# Patient Record
Sex: FEMALE | Race: BLACK OR AFRICAN AMERICAN
[De-identification: names, ages, dates, MRNs, and addresses within clinical notes are randomized per-mention and may not be internally consistent; named-entity substitution may affect disease eponyms.]

---

## 2018-07-30 ENCOUNTER — HOSPITAL ENCOUNTER (OUTPATIENT)
Dept: HOSPITAL 92 - ERS | Age: 68
Setting detail: OBSERVATION
LOS: 1 days | Discharge: HOME | End: 2018-07-31
Attending: HOSPITALIST | Admitting: HOSPITALIST
Payer: MEDICARE

## 2018-07-30 VITALS — BODY MASS INDEX: 32.2 KG/M2

## 2018-07-30 DIAGNOSIS — I50.33: ICD-10-CM

## 2018-07-30 DIAGNOSIS — I48.92: ICD-10-CM

## 2018-07-30 DIAGNOSIS — Z79.899: ICD-10-CM

## 2018-07-30 DIAGNOSIS — Z88.5: ICD-10-CM

## 2018-07-30 DIAGNOSIS — E78.5: ICD-10-CM

## 2018-07-30 DIAGNOSIS — E11.9: ICD-10-CM

## 2018-07-30 DIAGNOSIS — I25.10: ICD-10-CM

## 2018-07-30 DIAGNOSIS — J44.1: Primary | ICD-10-CM

## 2018-07-30 DIAGNOSIS — F17.200: ICD-10-CM

## 2018-07-30 DIAGNOSIS — Z79.02: ICD-10-CM

## 2018-07-30 DIAGNOSIS — I11.0: ICD-10-CM

## 2018-07-30 DIAGNOSIS — J96.01: ICD-10-CM

## 2018-07-30 LAB
ALBUMIN SERPL BCG-MCNC: 3.6 G/DL (ref 3.4–4.8)
ALP SERPL-CCNC: 125 U/L (ref 40–150)
ALT SERPL W P-5'-P-CCNC: 28 U/L (ref 8–55)
ANION GAP SERPL CALC-SCNC: 14 MMOL/L (ref 10–20)
AST SERPL-CCNC: 29 U/L (ref 5–34)
BILIRUB SERPL-MCNC: 0.3 MG/DL (ref 0.2–1.2)
BUN SERPL-MCNC: 17 MG/DL (ref 9.8–20.1)
CALCIUM SERPL-MCNC: 9 MG/DL (ref 7.8–10.44)
CHLORIDE SERPL-SCNC: 102 MMOL/L (ref 98–107)
CK MB SERPL-MCNC: 1.4 NG/ML (ref 0–6.6)
CK SERPL-CCNC: 47 U/L (ref 29–168)
CO2 SERPL-SCNC: 23 MMOL/L (ref 23–31)
CREAT CL PREDICTED SERPL C-G-VRATE: 0 ML/MIN (ref 70–130)
GLOBULIN SER CALC-MCNC: 3.2 G/DL (ref 2.4–3.5)
GLUCOSE SERPL-MCNC: 351 MG/DL (ref 80–115)
HGB BLD-MCNC: 10 G/DL (ref 12–16)
MCH RBC QN AUTO: 25.7 PG (ref 27–31)
MCV RBC AUTO: 78.6 FL (ref 78–98)
MDIFF COMPLETE?: YES
PLATELET # BLD AUTO: 242 THOU/UL (ref 130–400)
PLATELET BLD QL SMEAR: (no result)
POTASSIUM SERPL-SCNC: 3.2 MMOL/L (ref 3.5–5.1)
RBC # BLD AUTO: 3.88 MILL/UL (ref 4.2–5.4)
SODIUM SERPL-SCNC: 136 MMOL/L (ref 136–145)
TROPONIN I SERPL DL<=0.01 NG/ML-MCNC: (no result) NG/ML (ref ?–0.03)
WBC # BLD AUTO: 4.6 THOU/UL (ref 4.8–10.8)

## 2018-07-30 PROCEDURE — 83880 ASSAY OF NATRIURETIC PEPTIDE: CPT

## 2018-07-30 PROCEDURE — 84484 ASSAY OF TROPONIN QUANT: CPT

## 2018-07-30 PROCEDURE — 96376 TX/PRO/DX INJ SAME DRUG ADON: CPT

## 2018-07-30 PROCEDURE — 82553 CREATINE MB FRACTION: CPT

## 2018-07-30 PROCEDURE — 80048 BASIC METABOLIC PNL TOTAL CA: CPT

## 2018-07-30 PROCEDURE — 94640 AIRWAY INHALATION TREATMENT: CPT

## 2018-07-30 PROCEDURE — 80053 COMPREHEN METABOLIC PANEL: CPT

## 2018-07-30 PROCEDURE — 94760 N-INVAS EAR/PLS OXIMETRY 1: CPT

## 2018-07-30 PROCEDURE — G0378 HOSPITAL OBSERVATION PER HR: HCPCS

## 2018-07-30 PROCEDURE — A4216 STERILE WATER/SALINE, 10 ML: HCPCS

## 2018-07-30 PROCEDURE — 93798 PHYS/QHP OP CAR RHAB W/ECG: CPT

## 2018-07-30 PROCEDURE — 36416 COLLJ CAPILLARY BLOOD SPEC: CPT

## 2018-07-30 PROCEDURE — 96372 THER/PROPH/DIAG INJ SC/IM: CPT

## 2018-07-30 PROCEDURE — 97139 UNLISTED THERAPEUTIC PX: CPT

## 2018-07-30 PROCEDURE — 96374 THER/PROPH/DIAG INJ IV PUSH: CPT

## 2018-07-30 PROCEDURE — 93306 TTE W/DOPPLER COMPLETE: CPT

## 2018-07-30 PROCEDURE — 71046 X-RAY EXAM CHEST 2 VIEWS: CPT

## 2018-07-30 PROCEDURE — 82550 ASSAY OF CK (CPK): CPT

## 2018-07-30 PROCEDURE — 85025 COMPLETE CBC W/AUTO DIFF WBC: CPT

## 2018-07-30 PROCEDURE — 36415 COLL VENOUS BLD VENIPUNCTURE: CPT

## 2018-07-30 PROCEDURE — 93005 ELECTROCARDIOGRAM TRACING: CPT

## 2018-07-30 PROCEDURE — 82962 GLUCOSE BLOOD TEST: CPT

## 2018-07-30 PROCEDURE — 99285 EMERGENCY DEPT VISIT HI MDM: CPT

## 2018-07-30 RX ADMIN — NIFEDIPINE SCH MG: 30 TABLET, FILM COATED, EXTENDED RELEASE ORAL at 08:58

## 2018-07-30 RX ADMIN — INSULIN GLARGINE SCH MLS: 100 INJECTION, SOLUTION SUBCUTANEOUS at 09:54

## 2018-07-30 RX ADMIN — INSULIN LISPRO PRN UNIT: 100 INJECTION, SOLUTION INTRAVENOUS; SUBCUTANEOUS at 17:34

## 2018-07-30 RX ADMIN — ASPIRIN SCH MG: 81 TABLET ORAL at 08:57

## 2018-07-30 RX ADMIN — Medication SCH ML: at 09:03

## 2018-07-30 RX ADMIN — INSULIN LISPRO PRN UNIT: 100 INJECTION, SOLUTION INTRAVENOUS; SUBCUTANEOUS at 07:13

## 2018-07-30 RX ADMIN — INSULIN LISPRO PRN UNIT: 100 INJECTION, SOLUTION INTRAVENOUS; SUBCUTANEOUS at 21:18

## 2018-07-30 RX ADMIN — Medication SCH ML: at 20:23

## 2018-07-30 NOTE — RAD
2 VIEWS CHEST:

 

Date:  07/30/18 

 

COMPARISON:  

02/04/15. 

 

HISTORY:  

Dyspnea and shortness of breath. 

 

FINDINGS:

There is pulmonary vascular congestion. There is atherosclerotic calcification of the aortic arch. No
 pneumothorax or pleural fluid is seen. There is no focal consolidation or alveolar edema. There is m
ild perihilar and bibasilar interstitial prominence. 

 

IMPRESSION: 

Findings suggesting mild interstitial pulmonary edema. Recommend follow-up imaging following treatmen
t. 

 

 

POS: [facility]

## 2018-07-30 NOTE — PDOC.EVN
Event Note





- Event Note


Event Note: 





Pt seen and examined. chart reviewed


feels much better and is on RA without any SOB





Monitor strips show rate controlled A-flutter. No h/o same .Follows with Dr. Webber(cardiology) in [place] .H/O Severe CAD on Cath in 2014.





Will consult EP.follow ECHO results. may need further cardiologoical work up if 

EF <35%.


No evidence of ACS.


cont meds as ordered by admitting MD.


cont IV Solumedrol,duonebs,Lasix IV etc





am labs


will follow

## 2018-07-30 NOTE — HP
DATE OF ADMISSION:  07/30/2018

 

CHIEF COMPLAINT:  Shortness of breath and increased cough.

 

HISTORY OF PRESENT ILLNESS:  This is a 68-year-old  female with known history of audrey
estive heart failure, hypertension, type 2 diabetes mellitus, has a history of COPD.  The patient is 
noted to have a worsening cough and congestion with increased sputum production for the past few days
, so she decided to come to the ER for further evaluation.  When she presented to the ER, she had a m
ildly elevated BNP and she was very hypoxic, but improving on the breathing treatments.  The patient 
was about to go home, but she was noted to have increased heart rate of 130s.  The patient had normal
 oxygenation, but because of the worsening tachycardia which was suspicious for paroxysmal atrial fib
rillation, the patient was admitted for further evaluation.  The patient is seen on the floor.  She w
as alert and oriented.  She does agree that she is smoking a lot and she has known history of COPD.  
She goes to a pulmonologist in [place].  Denies having any chest pain at this time.  No dizziness, no
 nausea, no vomiting.  She denies having any fevers.

 

PAST MEDICAL HISTORY:

1.  COPD.

2.  Congestive heart failure.

3.  Type 2 diabetes mellitus.

4.  Hypertension.

5.  Hyperlipidemia.

6.  History of coronary artery disease.

 

PAST SURGICAL HISTORY:

1.  Hysterectomy.

2.  Cyst removal from the neck.

3.  Cardiac catheterization.

 

ALLERGIES:  CODEINE.

 

SOCIAL HISTORY:  The patient is an active smoker.  She denies alcohol or any recreational drugs.

 

FAMILY HISTORY:  Relevant for colon cancer, type 2 diabetes mellitus in one of the family members.

 

REVIEW OF SYSTEMS:  All 12 systems are reviewed with the patient thoroughly and found to be negative 
at this time.  

Constitutional:  Weight loss or gain, ability to conduct usual

activities.

Skin:  Rash, itching.

Eyes:  Double vision, pain.

ENT/Mouth:  Nose bleeding, neck stiffness, pain, tenderness.

Cardiovascular:  Palpitations, dyspnea on exertion, orthopnea.

Respiratory:  Shortness of breath, wheezing, cough, hemoptysis, 

fever or night sweats.

Gastrointestinal:  Poor appetite, abdominal pain, heartburn, 

nausea, vomiting, constipation, or diarrhea.

Genitourinary:  Urgency, frequency, dysuria, nocturia.

Musculoskeletal:  Pain, swelling.

Neurologic/Psychiatric:  Anxiety, depression.

Allergy/Immunologic:  Skin rash, bleeding tendency.

HOME MEDICATIONS:

1.  Lasix 40 mg p.o. daily.  

2.  Metformin 1000 mg p.o. b.i.d.

3.  Aspirin 81 mg p.o. daily.

4.  Atorvastatin 40 mg p.o. daily.

5.  Biotin. 

6.  Ferrous sulfate.

7.  Metoprolol 100 mg p.o. b.i.d.

8.  Montelukast 10 mg p.o. daily.

9.  Nifedipine 30 mg p.o. daily.

10.  Protonix 40 mg p.o. daily.

11.  Potassium chloride 20 mEq p.o. daily.

12.  Sertraline 100 mg p.o. daily.  

13.  Tramadol.

 

PHYSICAL EXAMINATION:

VITAL SIGNS:  Blood pressures are 145/68, heart rate is 91, respiration rate is 22, saturation 93%.

GENERAL:  The patient is moderately built and moderate nourished, does not appear dyspneic acute dist
ress at this time.  Alert and oriented x3.

MUSCULOSKELETAL:  No calf tenderness.  No pedal edema.  No joint redness, no joint swelling.

LUNGS:  Bilateral air entry was reduced with wheezing noted bilaterally.  No use of any accessory mus
cles of respiration.  No evidence of crackles were noted.

ABDOMEN:  Soft, nontender, no guarding, no rebound tenderness.  Bowel sounds normal.

MUSCULOSKELETAL:  No calf tenderness.  No pedal edema.  No joint tenderness, no joint swelling.

SKIN:  No cyanosis, no erythema, no rash, no pallor.

NEUROLOGIC:  Cranial nerves II through XII intact.  No focal deficits were noted at this time.

PSYCHIATRIC:  No signs of suicidal ideation.  No signs of jn was noted.

 

LABORATORY DATA:  WBC 4.6, hemoglobin is 10.1, hematocrit 30.5, platelets 242.  Sodium 136, potassium
 3.2, chloride 102, bicarbonate 23, BUN 17, creatinine 1.27, blood sugar is 433.

 

ASSESSMENT:

1.  Acute chronic obstructive pulmonary disease exacerbation.

2.  Acute congestive heart failure, diastolic dysfunction.

3.  Hypertension.

4.  Hyperglycemia with type 2 diabetes mellitus.

5.  Hyperlipidemia.

6.  Hypothyroidism.

 

PLAN:

1.  Plan is to closely monitor this patient.  We will continue the DuoNebs q.4h. and albuterol nebs q
.2h. Patient has clear signs of congestion with a prolonged expiratory phase suggestive of bronchocon
striction.  We will start the patient on Solu-Medrol 40 mg q.6h.

2.  The patient has history of type 2 diabetes mellitus, has poorly controlled blood sugars at 400.  
We will start the patient on Lantus 30 units subcu daily and with sliding scale insulin.

3.  Hypertension is well controlled.  We will restart the patient's home medications.

4.  Hyperlipidemia.  We will continue the patient's atorvastatin at this time.

5.  The patient has sinus tachycardia.  I will continue the patient's metoprolol tartrate, likely thi
s is from nebulizer treatment she received in the ER.

6.  Deep venous thrombosis prophylaxis, Lovenox.

 

I spent 75 minutes with this patient.

## 2018-07-31 VITALS — TEMPERATURE: 98.2 F

## 2018-07-31 VITALS — DIASTOLIC BLOOD PRESSURE: 71 MMHG | SYSTOLIC BLOOD PRESSURE: 148 MMHG

## 2018-07-31 LAB
ANION GAP SERPL CALC-SCNC: 13 MMOL/L (ref 10–20)
BASOPHILS # BLD AUTO: 0 THOU/UL (ref 0–0.2)
BASOPHILS NFR BLD AUTO: 0.4 % (ref 0–1)
BUN SERPL-MCNC: 20 MG/DL (ref 9.8–20.1)
CALCIUM SERPL-MCNC: 9.3 MG/DL (ref 7.8–10.44)
CHLORIDE SERPL-SCNC: 102 MMOL/L (ref 98–107)
CO2 SERPL-SCNC: 27 MMOL/L (ref 23–31)
CREAT CL PREDICTED SERPL C-G-VRATE: 82 ML/MIN (ref 70–130)
EOSINOPHIL # BLD AUTO: 0 THOU/UL (ref 0–0.7)
EOSINOPHIL NFR BLD AUTO: 0 % (ref 0–10)
GLUCOSE SERPL-MCNC: 289 MG/DL (ref 80–115)
HGB BLD-MCNC: 9.7 G/DL (ref 12–16)
LYMPHOCYTES # BLD: 0.6 THOU/UL (ref 1.2–3.4)
LYMPHOCYTES NFR BLD AUTO: 12.7 % (ref 21–51)
MCH RBC QN AUTO: 25 PG (ref 27–31)
MCV RBC AUTO: 79.1 FL (ref 78–98)
MONOCYTES # BLD AUTO: 0.3 THOU/UL (ref 0.11–0.59)
MONOCYTES NFR BLD AUTO: 5.4 % (ref 0–10)
NEUTROPHILS # BLD AUTO: 4 THOU/UL (ref 1.4–6.5)
NEUTROPHILS NFR BLD AUTO: 81.4 % (ref 42–75)
PLATELET # BLD AUTO: 248 THOU/UL (ref 130–400)
POTASSIUM SERPL-SCNC: 3.8 MMOL/L (ref 3.5–5.1)
RBC # BLD AUTO: 3.86 MILL/UL (ref 4.2–5.4)
SODIUM SERPL-SCNC: 138 MMOL/L (ref 136–145)
WBC # BLD AUTO: 5 THOU/UL (ref 4.8–10.8)

## 2018-07-31 RX ADMIN — NIFEDIPINE SCH MG: 30 TABLET, FILM COATED, EXTENDED RELEASE ORAL at 10:05

## 2018-07-31 RX ADMIN — INSULIN LISPRO PRN UNIT: 100 INJECTION, SOLUTION INTRAVENOUS; SUBCUTANEOUS at 06:27

## 2018-07-31 RX ADMIN — Medication SCH ML: at 10:06

## 2018-07-31 RX ADMIN — INSULIN GLARGINE SCH MLS: 100 INJECTION, SOLUTION SUBCUTANEOUS at 10:03

## 2018-07-31 RX ADMIN — ASPIRIN SCH MG: 81 TABLET ORAL at 10:03

## 2018-07-31 RX ADMIN — INSULIN LISPRO PRN UNIT: 100 INJECTION, SOLUTION INTRAVENOUS; SUBCUTANEOUS at 12:01

## 2018-07-31 NOTE — CON
DATE OF CONSULTATION:  07/30/2018

 

ELECTROPHYSIOLOGY CONSULTATION REPORT

 

REFERRING PHYSICIAN:  Dr. Mack.

 

I am seeing Ms. Swain at our [facility] telemetry floor as an electrophysiology consultant.
  Her problems are:

1.  Newly found atrial flutter with rapid ventricular rates.

2.  Acute respiratory failure with underlying COPD exacerbation and possible congestive heart failure
 exacerbation.

3.  Risk factors including type 2 diabetes, hypertension, and hyperlipidemia.

4.  Prior history of coronary artery disease with remote history of stenting in 2002, still on aspiri
n and Plavix.

 

ALLERGIES:  CODEINE.

 

MEDICATIONS:  At home include hydrocodone, albuterol, aspirin, Lipitor, dextrose, Lovenox, ferrous delacruz
lfate, furosemide, glucagon, insulin, lisinopril, metoprolol tartrate 100 mg twice a day.

 

SUBJECTIVE:  Ms. Swain is here with progressive dyspnea.  She noted fevers, chills, worsening cough 
with sputum production for at least last couple of days.  She did notice increasing fatigue, was not 
aware of her rapid heartbeats.  Rest of 12-point systems otherwise unremarkable.

 

PAST MEDICAL HISTORY:  As above.  She has been followed by cardiologist in [place] after her stent, b
ut no recent need for cardiac intervention was noted.  She denies prior history of arrhythmias.

 

PAST SURGICAL HISTORY:  Significant for hysterectomy, cyst removal and stent placement in the past.

 

SOCIAL HISTORY:  The patient is an active smoker.  Denies ETOH or drug abuse.

 

FAMILY HISTORY:  Significant for colon cancer, type 2 diabetes in one of the family members.

 

OBJECTIVE:

VITAL SIGNS:  Blood pressure is 144/62 in a supine position, 127/60 on a sitting position, 120/58 sta
nding up; heart rate 85; respirations 20; temperature 98.2 degrees Fahrenheit.

GENERAL:  Alert and oriented woman, in no apparent distress.

NECK:  Supple.  Jugular veins not distended.

CHEST:  Coarse without crackles.

CARDIOVASCULAR:  Heart sounds are irregularly irregular.  S1 and S2 is variable.  No murmur or gallop
.

ABDOMEN:  Benign.  Bowel sounds positive.

EXTREMITIES:  Lower extremities without edema, clubbing or cyanosis.  Pulses are adequate.

NEUROLOGIC:  The patient is nonfocal.

MUSCULOSKELETAL:  No joint swelling or deformities.

SKIN:  Without rash.

 

DATABASE:  EKGs reviewed.  Initial EKG reveals an atrial flutter, possibly typically _____ morphology
, although atypical features also noted.  A 2D echo from 07/30/2018 shows LVEF of 55% to 60%, mild di
lated left atrium, mitral regurgitation, mild tricuspid regurgitation.

 

LABORATORY DATA:  White count 4.6, hemoglobin 10, platelet count is 242.  Sodium 136, potassium 3.2, 
BUN 17, creatinine 1.27 _____ BNP is 402.  Troponin I is less than 0.01.

 

ASSESSMENT AND PLAN:  Ms. Swain is a 68-year-old woman with history of coronary artery stenting in t
he distant past, currently on aspirin and Plavix.  She is admitted with upper respiratory tract infec
tion symptoms without profound toxic signs.  On the other hand, she was found to be in atrial flutter
, which was not known to her before.  The rates are somewhat rapid initially, but overall well contro
lled, hence she is chronically taking metoprolol high dose.

 

We discussed treatment options for the atrial flutter.  At this point, I would recommend adding an an
ticoagulant.  She could benefit from a cardioversion, possibly RACHELLE guided before returning home, but 
also this could be performed as an outpatient as well after stabilization from her pulmonary conditio
n.  I also discussed ablation options, which could be considered in the future if recurrences are see
n after resolution of her initial episode associated with acute respiratory failure.

 

We will follow with you.  Thank you for the consult.

## 2018-08-01 NOTE — DIS
DATE OF ADMISSION:  07/30/2018

 

DATE OF DISCHARGE:  07/31/2018

 

CONDITION AT THE TIME OF DISCHARGE:  Stable and improved.

 

PRIMARY CARE PHYSICIAN:  Dr. Trimble in [place].

 

PRIMARY CARDIOLOGIST:  Dr. Webber in [place].

 

DISCHARGE DIAGNOSES:

1.  New diagnosis of atrial flutter with rapid ventricular now rate, now controlled.

2.  Acute on chronic diastolic congestive heart failure.

3.  Acute chronic obstructive pulmonary disease exacerbation.

4.  Acute hypoxic respiratory failure, resolved.

5.  History of chronic obstructive pulmonary disease.

6.  Diabetes type 2.

7.  Hypertension.

8.  Dyslipidemia.

9.  Coronary artery disease.

 

INHOUSE CONSULTATION:  Electrophysiology, Dr. Manuel Stroud.

 

PROCEDURES DONE IN THE HOSPITAL:  Transthoracic echocardiogram, which shows EF of 55%-60%, mildly dil
ated left atrium and moderate mitral regurgitation.

 

DISCHARGE MEDICATIONS:  Remain the same as admission medication.  Please see admission history and ph
ysical for further details.  New medications are as follows, Medrol Dosepak 1 pack use as directed, X
arelto 20 mg daily for new diagnosis of atrial flutter, DuoNebs as needed every 4 hours  for COPD.

 

HISTORY OF PRESENTING ILLNESS:  Ms. Swain is a pleasant 68-year-old female with known history of chr
onic diastolic congestive heart failure, COPD, diabetes, hypertension, dyslipidemia, and coronary art
trice disease who is still smoking, presented to the emergency room with complaints of shortness of nabil
ath and increased cough.  Upon chest x-ray, she had no evidence of infection.  She did have right pul
monary edema and was admitted for COPD and CHF exacerbation.  Please see admission history and physic
al for further detail.  Oxygen saturation was 93% on room air upon presentation.  She was started on 
diuresis as well as IV Solu-Medrol, nebulizers and oxygen for the treatment initially.

 

HOSPITAL COURSE:  The patient was found to have new onset of atrial flutter after she presented to 
e floor.  EP was consulted and she was continued on diuresis while she was here with excellent respon
se.  She underwent echocardiogram with the above-mentioned results with preserved ejection fraction. 
 She has history of coronary artery disease with intervention in the past.  She had a cardiac cathete
rization done in 2014, which showed severe coronary artery disease.

 

Dr. Stroud saw the patient and recommended starting on Xarelto for stroke prophylaxis and outpatient wo
rkup with possible RACHELLE and cardioversion for her atrial flutter.

 

By the time of discharge, the patient was back to her baseline and was not requiring any oxygen.  Her
 examination showed clinical improvement and she was discharged.

 

PHYSICAL EXAMINATION:  She was seen and examined prior to discharge.

 

PHYSICAL EXAMINATION:

VITAL SIGNS:  This morning, blood pressure 148/71, heart rate 80, respirations 20, saturating 94% on 
room air in no acute distress.

GENERAL APPEARANCE:  Awake, alert, oriented x3.

CHEST:  Clear to auscultation without any wheezing, rales or rhonchi.  Rhythm is irregularly irregula
r without any murmurs.  Heart rate controlled.

 

LABORATORY EXAMINATION:  Hemoglobin 9.7, .

 

The patient is instructed to follow with her own cardiologist in [place] with Dr. Webber as soon as
 possible and start taking the Xarelto for now.  Prescriptions were provided for all other new medica
tions.  I called her daughter, Ms. Guzman over the phone and discussed her mother's diagnosis and susana
atment plan as well.  They verbalized understanding.  Follow up with primary care physician in 1-2 we
eks and follow up with electrophysiology, Dr. Stroud in 2 weeks.  She has been cleared for discharge by
 Dr. Stroud as well.

## 2018-08-02 ENCOUNTER — HOSPITAL ENCOUNTER (EMERGENCY)
Dept: HOSPITAL 92 - ERS | Age: 68
LOS: 1 days | Discharge: HOME | End: 2018-08-03
Payer: MEDICARE

## 2018-08-02 DIAGNOSIS — Z79.899: ICD-10-CM

## 2018-08-02 DIAGNOSIS — Z79.82: ICD-10-CM

## 2018-08-02 DIAGNOSIS — F17.210: ICD-10-CM

## 2018-08-02 DIAGNOSIS — J45.909: ICD-10-CM

## 2018-08-02 DIAGNOSIS — R60.0: Primary | ICD-10-CM

## 2018-08-02 DIAGNOSIS — E78.5: ICD-10-CM

## 2018-08-02 DIAGNOSIS — I10: ICD-10-CM

## 2018-08-02 DIAGNOSIS — E11.9: ICD-10-CM

## 2018-08-02 DIAGNOSIS — Z79.84: ICD-10-CM

## 2018-08-02 DIAGNOSIS — F32.9: ICD-10-CM

## 2018-08-02 PROCEDURE — 93005 ELECTROCARDIOGRAM TRACING: CPT

## 2018-08-02 PROCEDURE — 83880 ASSAY OF NATRIURETIC PEPTIDE: CPT

## 2018-08-02 PROCEDURE — 71046 X-RAY EXAM CHEST 2 VIEWS: CPT

## 2018-08-02 PROCEDURE — 80053 COMPREHEN METABOLIC PANEL: CPT

## 2018-08-02 PROCEDURE — 96374 THER/PROPH/DIAG INJ IV PUSH: CPT

## 2018-08-02 PROCEDURE — 85025 COMPLETE CBC W/AUTO DIFF WBC: CPT

## 2018-08-02 NOTE — ULT
VENOUS DOPPLER ULTRASOUND OF THE RIGHT LOWER EXTREMITY:

8/2/18

 

HISTORY: 

Right lower extremity pain and edema. 

 

TECHNIQUE:  

Gray scale ultrasound with color flow and spectral doppler imaging of the deep venous system of the r
ight lower extremity is performed.  

 

 

FINDINGS:  

There is good flow, compression and augmentation noted in the common femoral, femoral, deep femoral, 
popliteal, posterior tibial, anterior tibial and greater saphenous veins. 

 

IMPRESSION:  

No evidence of DVT in the right lower extremity.

 

POS: [facility]

## 2018-08-02 NOTE — RAD
PA AND LATERAL VIEWS OF THE CHEST:

8/2/18

 

HISTORY: 

Cough.

 

FINDINGS:  

Comparison is made with exam of 7/30/18.

 

The heart size is borderline. The aorta is tortuous and the lungs are well expanded without lobar con
solidation, pneumothoraces, or pleural effusions. Mild prominence in the pulmonary vascularity is aga
in noted. 

 

IMPRESSION:  

Mild interstitial pulmonary edema. 

 

POS: [facility]

## 2018-08-03 LAB
ALBUMIN SERPL BCG-MCNC: 4 G/DL (ref 3.4–4.8)
ALP SERPL-CCNC: 152 U/L (ref 40–150)
ALT SERPL W P-5'-P-CCNC: 25 U/L (ref 8–55)
ANION GAP SERPL CALC-SCNC: 18 MMOL/L (ref 10–20)
AST SERPL-CCNC: 14 U/L (ref 5–34)
BASOPHILS # BLD AUTO: 0 THOU/UL (ref 0–0.2)
BASOPHILS NFR BLD AUTO: 0.5 % (ref 0–1)
BILIRUB SERPL-MCNC: 0.3 MG/DL (ref 0.2–1.2)
BUN SERPL-MCNC: 29 MG/DL (ref 9.8–20.1)
CALCIUM SERPL-MCNC: 9.1 MG/DL (ref 7.8–10.44)
CHLORIDE SERPL-SCNC: 102 MMOL/L (ref 98–107)
CO2 SERPL-SCNC: 22 MMOL/L (ref 23–31)
CREAT CL PREDICTED SERPL C-G-VRATE: 0 ML/MIN (ref 70–130)
EOSINOPHIL # BLD AUTO: 0 THOU/UL (ref 0–0.7)
EOSINOPHIL NFR BLD AUTO: 0.5 % (ref 0–10)
GLOBULIN SER CALC-MCNC: 3.2 G/DL (ref 2.4–3.5)
GLUCOSE SERPL-MCNC: 313 MG/DL (ref 80–115)
HGB BLD-MCNC: 10 G/DL (ref 12–16)
LYMPHOCYTES # BLD: 1.5 THOU/UL (ref 1.2–3.4)
LYMPHOCYTES NFR BLD AUTO: 20.5 % (ref 21–51)
MCH RBC QN AUTO: 25.2 PG (ref 27–31)
MCV RBC AUTO: 78.3 FL (ref 78–98)
MONOCYTES # BLD AUTO: 0.8 THOU/UL (ref 0.11–0.59)
MONOCYTES NFR BLD AUTO: 10.6 % (ref 0–10)
NEUTROPHILS # BLD AUTO: 5 THOU/UL (ref 1.4–6.5)
NEUTROPHILS NFR BLD AUTO: 67.8 % (ref 42–75)
PLATELET # BLD AUTO: 278 THOU/UL (ref 130–400)
POTASSIUM SERPL-SCNC: 3.5 MMOL/L (ref 3.5–5.1)
RBC # BLD AUTO: 3.94 MILL/UL (ref 4.2–5.4)
SODIUM SERPL-SCNC: 138 MMOL/L (ref 136–145)
WBC # BLD AUTO: 7.3 THOU/UL (ref 4.8–10.8)

## 2019-07-02 ENCOUNTER — HOSPITAL ENCOUNTER (OUTPATIENT)
Dept: HOSPITAL 92 - ERS | Age: 69
Setting detail: OBSERVATION
LOS: 2 days | Discharge: HOME | End: 2019-07-04
Attending: HOSPITALIST | Admitting: HOSPITALIST
Payer: MEDICARE

## 2019-07-02 VITALS — BODY MASS INDEX: 32.1 KG/M2

## 2019-07-02 DIAGNOSIS — I25.10: ICD-10-CM

## 2019-07-02 DIAGNOSIS — I11.0: ICD-10-CM

## 2019-07-02 DIAGNOSIS — Z66: ICD-10-CM

## 2019-07-02 DIAGNOSIS — F17.210: ICD-10-CM

## 2019-07-02 DIAGNOSIS — D64.9: ICD-10-CM

## 2019-07-02 DIAGNOSIS — Z88.5: ICD-10-CM

## 2019-07-02 DIAGNOSIS — I48.92: ICD-10-CM

## 2019-07-02 DIAGNOSIS — E11.9: ICD-10-CM

## 2019-07-02 DIAGNOSIS — F32.9: ICD-10-CM

## 2019-07-02 DIAGNOSIS — Z86.718: ICD-10-CM

## 2019-07-02 DIAGNOSIS — J44.1: Primary | ICD-10-CM

## 2019-07-02 DIAGNOSIS — I50.33: ICD-10-CM

## 2019-07-02 DIAGNOSIS — Z79.01: ICD-10-CM

## 2019-07-02 DIAGNOSIS — E78.5: ICD-10-CM

## 2019-07-02 DIAGNOSIS — E87.6: ICD-10-CM

## 2019-07-02 DIAGNOSIS — I45.2: ICD-10-CM

## 2019-07-02 DIAGNOSIS — Z95.5: ICD-10-CM

## 2019-07-02 DIAGNOSIS — Z79.84: ICD-10-CM

## 2019-07-02 DIAGNOSIS — Z79.899: ICD-10-CM

## 2019-07-02 LAB
ALBUMIN SERPL BCG-MCNC: 3.8 G/DL (ref 3.4–4.8)
ALP SERPL-CCNC: 90 U/L (ref 40–150)
ALT SERPL W P-5'-P-CCNC: 9 U/L (ref 8–55)
ANION GAP SERPL CALC-SCNC: 13 MMOL/L (ref 10–20)
AST SERPL-CCNC: 14 U/L (ref 5–34)
BASOPHILS # BLD AUTO: 0.1 THOU/UL (ref 0–0.2)
BASOPHILS NFR BLD AUTO: 1 % (ref 0–1)
BILIRUB SERPL-MCNC: 0.4 MG/DL (ref 0.2–1.2)
BUN SERPL-MCNC: 15 MG/DL (ref 9.8–20.1)
CALCIUM SERPL-MCNC: 9.6 MG/DL (ref 7.8–10.44)
CHLORIDE SERPL-SCNC: 108 MMOL/L (ref 98–107)
CO2 SERPL-SCNC: 23 MMOL/L (ref 23–31)
CREAT CL PREDICTED SERPL C-G-VRATE: 0 ML/MIN (ref 70–130)
EOSINOPHIL # BLD AUTO: 0.1 THOU/UL (ref 0–0.7)
EOSINOPHIL NFR BLD AUTO: 1.2 % (ref 0–10)
GLOBULIN SER CALC-MCNC: 3.2 G/DL (ref 2.4–3.5)
GLUCOSE SERPL-MCNC: 96 MG/DL (ref 83–110)
HGB BLD-MCNC: 11 G/DL (ref 12–16)
LYMPHOCYTES # BLD: 1.3 THOU/UL (ref 1.2–3.4)
LYMPHOCYTES NFR BLD AUTO: 18.3 % (ref 21–51)
MCH RBC QN AUTO: 28 PG (ref 27–31)
MCV RBC AUTO: 85.2 FL (ref 78–98)
MONOCYTES # BLD AUTO: 0.8 THOU/UL (ref 0.11–0.59)
MONOCYTES NFR BLD AUTO: 11.3 % (ref 0–10)
NEUTROPHILS # BLD AUTO: 4.8 THOU/UL (ref 1.4–6.5)
NEUTROPHILS NFR BLD AUTO: 68.2 % (ref 42–75)
PLATELET # BLD AUTO: 286 THOU/UL (ref 130–400)
POTASSIUM SERPL-SCNC: 3.3 MMOL/L (ref 3.5–5.1)
RBC # BLD AUTO: 3.94 MILL/UL (ref 4.2–5.4)
SODIUM SERPL-SCNC: 141 MMOL/L (ref 136–145)
TROPONIN I SERPL DL<=0.01 NG/ML-MCNC: (no result) NG/ML (ref ?–0.03)
TROPONIN I SERPL DL<=0.01 NG/ML-MCNC: (no result) NG/ML (ref ?–0.03)
WBC # BLD AUTO: 7 THOU/UL (ref 4.8–10.8)

## 2019-07-02 PROCEDURE — 96374 THER/PROPH/DIAG INJ IV PUSH: CPT

## 2019-07-02 PROCEDURE — 36415 COLL VENOUS BLD VENIPUNCTURE: CPT

## 2019-07-02 PROCEDURE — 36416 COLLJ CAPILLARY BLOOD SPEC: CPT

## 2019-07-02 PROCEDURE — 82962 GLUCOSE BLOOD TEST: CPT

## 2019-07-02 PROCEDURE — 84484 ASSAY OF TROPONIN QUANT: CPT

## 2019-07-02 PROCEDURE — 85014 HEMATOCRIT: CPT

## 2019-07-02 PROCEDURE — 80048 BASIC METABOLIC PNL TOTAL CA: CPT

## 2019-07-02 PROCEDURE — 99285 EMERGENCY DEPT VISIT HI MDM: CPT

## 2019-07-02 PROCEDURE — 83880 ASSAY OF NATRIURETIC PEPTIDE: CPT

## 2019-07-02 PROCEDURE — 96375 TX/PRO/DX INJ NEW DRUG ADDON: CPT

## 2019-07-02 PROCEDURE — 94760 N-INVAS EAR/PLS OXIMETRY 1: CPT

## 2019-07-02 PROCEDURE — 85018 HEMOGLOBIN: CPT

## 2019-07-02 PROCEDURE — 80053 COMPREHEN METABOLIC PANEL: CPT

## 2019-07-02 PROCEDURE — 96366 THER/PROPH/DIAG IV INF ADDON: CPT

## 2019-07-02 PROCEDURE — 94640 AIRWAY INHALATION TREATMENT: CPT

## 2019-07-02 PROCEDURE — 71046 X-RAY EXAM CHEST 2 VIEWS: CPT

## 2019-07-02 PROCEDURE — 85025 COMPLETE CBC W/AUTO DIFF WBC: CPT

## 2019-07-02 PROCEDURE — 93005 ELECTROCARDIOGRAM TRACING: CPT

## 2019-07-02 PROCEDURE — 82565 ASSAY OF CREATININE: CPT

## 2019-07-02 PROCEDURE — 93010 ELECTROCARDIOGRAM REPORT: CPT

## 2019-07-02 PROCEDURE — 85049 AUTOMATED PLATELET COUNT: CPT

## 2019-07-02 PROCEDURE — 96365 THER/PROPH/DIAG IV INF INIT: CPT

## 2019-07-02 PROCEDURE — G0378 HOSPITAL OBSERVATION PER HR: HCPCS

## 2019-07-02 RX ADMIN — CEFTRIAXONE SCH MLS: 1 INJECTION, POWDER, FOR SOLUTION INTRAMUSCULAR; INTRAVENOUS at 23:17

## 2019-07-02 NOTE — RAD
EXAM:

Chest PA and lateral:



HISTORY:

Cough. 



COMPARISON:

8/2/2018



FINDINGS:



Heart: Upper normal heart size. There is a coronary artery stent.

Aorta: Atherosclerosis of the aortic knob

Pulmonary vessels: Slightly prominent.

Costophrenic angles: Costophrenic angles are clear. 

Lungs: Diffuse patchy interstitial opacities which are presumed be chronic. Hyperinflation.

Pneumothorax: No pneumothorax

Osseous structures: No osseous abnormalities

IMPRESSION:



1. Atherosclerosis.

2. Pulmonary vascular prominence.

3. Hyperinflation with chronic changes.







Reported By: Yamile Luevano 

Electronically Signed:  7/2/2019 4:37 PM

## 2019-07-03 LAB
ANION GAP SERPL CALC-SCNC: 12 MMOL/L (ref 10–20)
BUN SERPL-MCNC: 14 MG/DL (ref 9.8–20.1)
CALCIUM SERPL-MCNC: 8.9 MG/DL (ref 7.8–10.44)
CHLORIDE SERPL-SCNC: 105 MMOL/L (ref 98–107)
CO2 SERPL-SCNC: 27 MMOL/L (ref 23–31)
CREAT CL PREDICTED SERPL C-G-VRATE: 63 ML/MIN (ref 70–130)
CREAT CL PREDICTED SERPL C-G-VRATE: 75 ML/MIN (ref 70–130)
GLUCOSE SERPL-MCNC: 62 MG/DL (ref 83–110)
HGB BLD-MCNC: 11.1 G/DL (ref 12–16)
PLATELET # BLD AUTO: 269 THOU/UL (ref 130–400)
POTASSIUM SERPL-SCNC: 3.5 MMOL/L (ref 3.5–5.1)
SODIUM SERPL-SCNC: 140 MMOL/L (ref 136–145)

## 2019-07-03 RX ADMIN — CEFTRIAXONE SCH MLS: 1 INJECTION, POWDER, FOR SOLUTION INTRAMUSCULAR; INTRAVENOUS at 22:10

## 2019-07-03 RX ADMIN — Medication SCH ML: at 08:36

## 2019-07-03 RX ADMIN — Medication SCH ML: at 20:11

## 2019-07-03 RX ADMIN — NIFEDIPINE SCH MG: 30 TABLET, FILM COATED, EXTENDED RELEASE ORAL at 08:36

## 2019-07-03 NOTE — PRG
DATE OF SERVICE:  07/03/2019



SUBJECTIVE:  Ms. Swain is a very pleasant 69-year-old  female with

past medical history significant for COPD, heart failure with preserved EF,

paroxysmal atrial flutter, and ongoing tobacco abuse, who presented to the hospital

with complaints of worsening cough and shortness of breath. 



The patient continues to complain of some shortness of breath.  Her cough is slowly

improving with breathing treatments.  She denies any chest pain.  She denies any

nausea or vomiting.  Denies any fever or chills. 



OBJECTIVE:  VITAL SIGNS:  Blood pressure 142/65, pulse is 69, O2 saturation is 95%

on room air, respirations are 20, and temperature 98.4. 

GENERAL:  The patient is a moderately obese,  female, resting

comfortably in bed, in no acute distress. 

HEENT:  Head is atraumatic and normocephalic.  Mucous membranes are moist. 

NECK:  No obvious JVD.  Trachea is midline. 

CV:  S1 and S2.  Regular rate/tachycardic, with occasional irregularity. 

LUNGS:  Regular respiratory rate and pattern, overall decreased vesicular breath

sounds with occasional rhonchi.  No crackles. 

ABDOMEN:  Soft.  Positive bowel sounds. 

EXTREMITIES:  No edema.  Lower extremities are warm and well perfused. 

NEUROLOGIC:  Cranial nerves 2 through 12 grossly intact.  The patient is nonfocal.



LABORATORY DATA:  Sodium 140, potassium 3.5, chloride 105, BUN is 14, creatinine

1.01, and GFR 65.  Troponin was negative x2. 



ASSESSMENT:  

1. Shortness of breath and cough, secondary to combination of chronic obstructive

pulmonary disease/congestive heart failure, improving. 

2. Paroxysmal atrial flutter, CHADS-VASc equals 5, on Eliquis.

3. Chronic obstructive pulmonary disease with ongoing tobacco abuse.

4. Acute diastolic heart failure exacerbation, improving.

5. Mild chronic normocytic anemia, stable.

6. History of acute limb ischemia secondary to thrombosis, status post thrombectomy

in February 2019, on Eliquis, stable. 



PLAN:  At this time, we will continue pulmonary toilet and antibiotics.  We will add

steroids for her COPD exacerbation.  I have counseled her extensively on tobacco

cessation.  If she continues to improve, expect discharge tomorrow morning. 







Job ID:  [id number]

## 2019-07-03 NOTE — HP
CHIEF COMPLAINT:  Cough.



HISTORY OF PRESENT ILLNESS:  The patient is a 69-year-old female, who has had a

history of prior admissions with COPD and congestive heart failure, although her

echo a year ago showed preserved ejection fraction.  The patient is followed

primarily with PCP and cardiologist in [place].  A year ago, the patient was

admitted here with atrial fibrillation with rapid ventricular response.  She was

supposed to have a followup with Dr. Stroud, but she does not recall whether she did

that or not.  She has remained on anticoagulation nonetheless. 



The patient presented to the emergency department today.  She tells me that she has

been coughing for about a week.  It has been productive of some clear mucus.  She

has had some mild shortness of breath and dyspnea on exertion, but not too bad at

rest.  She has had some hoarseness of her voice.  She denies fevers, chills, or

chest pain, but she has had some nasal congestion and drainage, which she states is

generally year-round. 



The patient also reports that she has had some swelling in her lower extremities.

She indicated it is where she had blood clots and the Emergency Department

documentation suggests that she was diagnosed with DVTs in February; however, that

is not the case.  The patient actually had arterial thrombosis with a mechanical

thrombectomy and subsequent angioplasty. 



EMERGENCY ROOM COURSE:  The patient was worked up with labs and chest x-ray showing

vascular prominence, but no ligia pulmonary edema.  She had a BNP of 291.  She was

given a dose of Lasix along with some potassium and she tells me that her edema in

her lower extremities have already improved since that time. 



PAST MEDICAL HISTORY:  Notable for coronary artery disease, atrial fibrillation with

rapid ventricular response, diabetes mellitus, COPD, CHF with a preserved ejection

fraction, hypertension, depression and the above mentioned bilateral lower extremity

arterial occlusions. 



PAST SURGICAL HISTORY:  Coronary stent, hysterectomy, lower extremity arterial

thrombectomy and subsequent angioplasty. 



FAMILY HISTORY:  Mother had colon cancer.  Father had diabetes.  She has sisters

with diabetes.  A brother with COPD. 



SOCIAL HISTORY:  The patient smokes 6 cigarettes per day.  She denies alcohol or

drugs.  She is single.  Her daughter, Uziel Dumont would be her surrogate decision

maker and she is a DNAR. 



PHYSICAL EXAMINATION:

VITAL SIGNS:  Temperature 99.2, pulse 105, respirations 16, O2 saturation 95% on

room air, /60. 

GENERAL APPEARANCE:  Age-appropriate female, in no distress.  She is awake, alert,

oriented, pleasant, and cooperative. 

HEENT:  XENIA, no OP lesions. 

NECK:  Supple and symmetric.  Borderline JVD. 

HEART:  Regular rate and rhythm without murmurs, gallops, or rubs. 

LUNGS:  Clear to auscultation bilaterally.  No wheezes or rales. 

ABDOMEN:  Soft, nontender, and nondistended.  Positive bowel sounds.  No masses.  No

organomegaly. 

EXTREMITIES:  She has no cyanosis, clubbing, or edema and diminished pulses,

although they are palpable bilaterally in the feet. 

NEUROLOGICAL:  The patient is fully intact.  She has no obvious deficits.  Cranial

nerves are normal and cognition is normal. 

PSYCHIATRIC:  Normal affect and behavior. 

SKIN:  Normal turgor.  Warm and dry.



LABORATORY DATA:  White count 7.0, hemoglobin 11, platelets 286.  Sodium 141,

potassium 3.3, chloride 108.  The remainder of chemistry is normal.  Troponin less

than 0.01 x 2.  BNP is 291.8. 



IMAGIN. Chest x-ray again shows some atherosclerotic disease, some pulmonary vascular

prominence and hyperinflation with chronic changes. 

2. EKG showed sinus rhythm with incomplete right bundle branch block, left anterior

fascicular blocks, old septal infarct and it appears to be generally unchanged from

2018. 



IMPRESSION AND PLAN:  

1. Dyspnea.  This appears to be likely related to some combination of bronchitis on

top of chronic obstructive pulmonary disease along with some potential mild

decompensation of diastolic dysfunction related heart failure.  The patient will get

a dose of Levophed. 

2. Decompensated heart failure with a preserved ejection fraction more consistent

with diastolic dysfunction.  The patient has received a single dose of Lasix.  She

seems to be improved.  We will not add additional now.  We will anticipate resuming

her back on her usual dose in the morning.  We will get her up and around in the

morning to see, if her symptoms have improved.  If not, she may need another dose of

IV. 

3. Bronchitis.  We will add dose of Rocephin tonight.  She can likely be treated

with some p.o. antibiotics at discharge. 

4. Chronic obstructive pulmonary disease.  We will ensure the patient has some

nebulizer treatments available and oxygen as needed. 

5. Hypokalemia.  The patient has received doses of supplementation in the ER.  We

will recheck those in the morning. 

6. Hypertension, continue with her usual home regimen.

7. History of depression.  Continue with her sertraline.

8. Disposition.  The patient will remain on telemetry and observation.  Anticipate

short stay and likely will be able to discharge tomorrow. 







Job ID:  [id number]

## 2019-07-04 VITALS — DIASTOLIC BLOOD PRESSURE: 105 MMHG | SYSTOLIC BLOOD PRESSURE: 170 MMHG

## 2019-07-04 VITALS — TEMPERATURE: 98 F

## 2019-07-04 RX ADMIN — NIFEDIPINE SCH MG: 30 TABLET, FILM COATED, EXTENDED RELEASE ORAL at 08:23

## 2019-07-04 RX ADMIN — Medication SCH ML: at 08:23

## 2019-07-04 NOTE — DIS
DATE OF ADMISSION:  07/02/2019



DATE OF DISCHARGE:  07/04/2019



CHIEF COMPLAINT:  On admission, shortness of breath and cough.



DISCHARGE DIAGNOSES:  

1. Shortness of breath and cough, secondary to combination of chronic obstructive

pulmonary disease and diastolic heart failure exacerbation, resolved. 

2. Paroxysmal atrial flutter, CHADS-VASc equals 5, on Eliquis.

3. Chronic obstructive pulmonary disease with ongoing tobacco abuse.

4. Acute on chronic diastolic heart failure exacerbation, resolved.

5. Mild chronic normocytic anemia, stable.

6. History of acute limb ischemia secondary to thrombosis, status post thrombectomy,

February 2019, on Eliquis, stable. 



BRIEF HOSPITAL COURSE:  The patient is a very pleasant 69-year-old 

female with past medical history significant for COPD, heart failure with preserved

EF, paroxysmal atrial flutter, and ongoing tobacco abuse, who presented to the

hospital with complaints of worsening shortness of breath and productive cough.  Her

cough was productive of clear mucus.  The patient was admitted with shortness of

breath secondary to acute on chronic diastolic heart failure exacerbation as well as

COPD exacerbation.  She was treated with pulmonary toilet to include DuoNeb,

steroids, as well as IV Rocephin.  The patient was also given one dose of IV Lasix.

The patient did diurese well.  She responded well to breathing treatments and

steroids.  Her presenting symptoms have resolved and she is back to baseline this

morning.  Lungs are clear.  She has ambulated the halls without issue.  No chest

pain or shortness of breath.  The patient has no complaints at this time and is back

to her baseline. 



DISCHARGE DISPOSITION:  Home.



DISCHARGE CONDITION:  Stable.



DISCHARGE INSTRUCTIONS:  The patient has been counseled extensively on tobacco

cessation.  New medications will be Omnicef 300 mg capsule, one capsule p.o. q.12

hours for the next 5 days.  She will also be discharged on a Medrol Dosepak.  She

will continue her other home medications, which include Eliquis along with her

diabetic medications.  She will follow up with her PCP in the next 7 to 10 days.

The patient has also been counseled extensively on daily weights, fluid and sodium

restriction.  The patient will be discharged to home in good condition today. 







Job ID:  [id number]

## 2019-07-07 NOTE — EKG
Test Reason : 

Blood Pressure : ***/*** mmHG

Vent. Rate : 076 BPM     Atrial Rate : 076 BPM

   P-R Int : 178 ms          QRS Dur : 100 ms

    QT Int : 436 ms       P-R-T Axes : 053 -56 058 degrees

   QTc Int : 490 ms

 

Normal sinus rhythm

Incomplete right bundle branch block

Left anterior fascicular block

Septal infarct , age undetermined

Cannot rule out Inferior infarct , age undetermined

Biphasic T wave V2

Abnormal ECG

 

Confirmed by AFSHIN PELAYO DO ([id number]),  TRINITY BENITES ([id number]) on 7/7/2019 2:03:06 AM

 

Referred By:             Confirmed By:AFSHIN PELAYO DO

## 2020-03-17 ENCOUNTER — HOSPITAL ENCOUNTER (OUTPATIENT)
Dept: HOSPITAL 92 - ERS | Age: 70
Setting detail: OBSERVATION
LOS: 1 days | Discharge: HOME | End: 2020-03-18
Attending: INTERNAL MEDICINE | Admitting: INTERNAL MEDICINE
Payer: MEDICARE

## 2020-03-17 VITALS — BODY MASS INDEX: 35.9 KG/M2

## 2020-03-17 DIAGNOSIS — Z95.5: ICD-10-CM

## 2020-03-17 DIAGNOSIS — F32.9: ICD-10-CM

## 2020-03-17 DIAGNOSIS — E11.9: ICD-10-CM

## 2020-03-17 DIAGNOSIS — I11.0: ICD-10-CM

## 2020-03-17 DIAGNOSIS — J44.1: Primary | ICD-10-CM

## 2020-03-17 DIAGNOSIS — I25.10: ICD-10-CM

## 2020-03-17 DIAGNOSIS — F17.210: ICD-10-CM

## 2020-03-17 DIAGNOSIS — Z86.718: ICD-10-CM

## 2020-03-17 DIAGNOSIS — Z79.84: ICD-10-CM

## 2020-03-17 DIAGNOSIS — Z79.899: ICD-10-CM

## 2020-03-17 DIAGNOSIS — Z88.5: ICD-10-CM

## 2020-03-17 DIAGNOSIS — E78.5: ICD-10-CM

## 2020-03-17 DIAGNOSIS — I50.31: ICD-10-CM

## 2020-03-17 DIAGNOSIS — Z86.711: ICD-10-CM

## 2020-03-17 DIAGNOSIS — I48.0: ICD-10-CM

## 2020-03-17 DIAGNOSIS — E66.9: ICD-10-CM

## 2020-03-17 LAB
ALBUMIN SERPL BCG-MCNC: 4.2 G/DL (ref 3.4–4.8)
ALP SERPL-CCNC: 118 U/L (ref 40–110)
ALT SERPL W P-5'-P-CCNC: 18 U/L (ref 8–55)
ANION GAP SERPL CALC-SCNC: 16 MMOL/L (ref 10–20)
AST SERPL-CCNC: 17 U/L (ref 5–34)
BASOPHILS # BLD AUTO: 0.1 THOU/UL (ref 0–0.2)
BASOPHILS NFR BLD AUTO: 0.7 % (ref 0–1)
BILIRUB SERPL-MCNC: 0.4 MG/DL (ref 0.2–1.2)
BUN SERPL-MCNC: 18 MG/DL (ref 9.8–20.1)
CALCIUM SERPL-MCNC: 9.4 MG/DL (ref 7.8–10.44)
CHLORIDE SERPL-SCNC: 104 MMOL/L (ref 98–107)
CO2 SERPL-SCNC: 22 MMOL/L (ref 23–31)
CREAT CL PREDICTED SERPL C-G-VRATE: 0 ML/MIN (ref 70–130)
EOSINOPHIL # BLD AUTO: 0.1 THOU/UL (ref 0–0.7)
EOSINOPHIL NFR BLD AUTO: 0.6 % (ref 0–10)
GLOBULIN SER CALC-MCNC: 3.5 G/DL (ref 2.4–3.5)
GLUCOSE SERPL-MCNC: 208 MG/DL (ref 83–110)
HGB BLD-MCNC: 11.5 G/DL (ref 12–16)
LYMPHOCYTES # BLD: 1.5 THOU/UL (ref 1.2–3.4)
LYMPHOCYTES NFR BLD AUTO: 12.7 % (ref 21–51)
MCH RBC QN AUTO: 26.2 PG (ref 27–31)
MCV RBC AUTO: 81 FL (ref 78–98)
MONOCYTES # BLD AUTO: 1.1 THOU/UL (ref 0.11–0.59)
MONOCYTES NFR BLD AUTO: 9.6 % (ref 0–10)
NEUTROPHILS # BLD AUTO: 8.7 THOU/UL (ref 1.4–6.5)
NEUTROPHILS NFR BLD AUTO: 76.4 % (ref 42–75)
PLATELET # BLD AUTO: 268 THOU/UL (ref 130–400)
POTASSIUM SERPL-SCNC: 3.3 MMOL/L (ref 3.5–5.1)
RBC # BLD AUTO: 4.37 MILL/UL (ref 4.2–5.4)
SODIUM SERPL-SCNC: 139 MMOL/L (ref 136–145)
WBC # BLD AUTO: 11.4 THOU/UL (ref 4.8–10.8)

## 2020-03-17 PROCEDURE — 85025 COMPLETE CBC W/AUTO DIFF WBC: CPT

## 2020-03-17 PROCEDURE — 84443 ASSAY THYROID STIM HORMONE: CPT

## 2020-03-17 PROCEDURE — 71045 X-RAY EXAM CHEST 1 VIEW: CPT

## 2020-03-17 PROCEDURE — 80048 BASIC METABOLIC PNL TOTAL CA: CPT

## 2020-03-17 PROCEDURE — 93005 ELECTROCARDIOGRAM TRACING: CPT

## 2020-03-17 PROCEDURE — 36416 COLLJ CAPILLARY BLOOD SPEC: CPT

## 2020-03-17 PROCEDURE — 83880 ASSAY OF NATRIURETIC PEPTIDE: CPT

## 2020-03-17 PROCEDURE — 87205 SMEAR GRAM STAIN: CPT

## 2020-03-17 PROCEDURE — 36415 COLL VENOUS BLD VENIPUNCTURE: CPT

## 2020-03-17 PROCEDURE — 87070 CULTURE OTHR SPECIMN AEROBIC: CPT

## 2020-03-17 PROCEDURE — 93306 TTE W/DOPPLER COMPLETE: CPT

## 2020-03-17 PROCEDURE — 96375 TX/PRO/DX INJ NEW DRUG ADDON: CPT

## 2020-03-17 PROCEDURE — 94760 N-INVAS EAR/PLS OXIMETRY 1: CPT

## 2020-03-17 PROCEDURE — 96374 THER/PROPH/DIAG INJ IV PUSH: CPT

## 2020-03-17 PROCEDURE — 94640 AIRWAY INHALATION TREATMENT: CPT

## 2020-03-17 PROCEDURE — 84484 ASSAY OF TROPONIN QUANT: CPT

## 2020-03-17 PROCEDURE — 93798 PHYS/QHP OP CAR RHAB W/ECG: CPT

## 2020-03-17 PROCEDURE — 80053 COMPREHEN METABOLIC PANEL: CPT

## 2020-03-17 PROCEDURE — 99406 BEHAV CHNG SMOKING 3-10 MIN: CPT

## 2020-03-17 PROCEDURE — 96376 TX/PRO/DX INJ SAME DRUG ADON: CPT

## 2020-03-17 PROCEDURE — G0378 HOSPITAL OBSERVATION PER HR: HCPCS

## 2020-03-17 RX ADMIN — MOMETASONE FUROATE AND FORMOTEROL FUMARATE DIHYDRATE SCH PUFF: 100; 5 AEROSOL RESPIRATORY (INHALATION) at 18:30

## 2020-03-17 NOTE — HP
REASON FOR ADMISSION:  COPD exacerbation, mild CHF exacerbation with likely

diastolic dysfunction. 



HISTORY OF PRESENTING ILLNESS:  The patient gives history of developing shortness of

breath from yesterday morning.  This progressively got worse.  She has had lower

extremity edema more in the lower leg and foot area, which has been progressively

increasing as well.  She has mostly dry cough.  She developed a fever of 99 degrees

last night.  She normally ambulates with a walker.  She lives in [place], but is

visiting her sister and brother here in town.  The patient finally made it to

emergency room here.  She has taken a flu shot for last year and pneumococcal shot

in January of this year. 



PAST MEDICAL AND SURGICAL HISTORY:  History of coronary artery disease with one

stent placed in , multiple stents to right lower extremity.  She has had a

femoral-popliteal as well.  Last stent was placed on  in the right

lower extremity.  History of right lower extremity DVT, pulmonary embolus.  Last

episode of DVT was in , hypertension, dyslipidemia, diabetes mellitus type 2,

obesity, depression, paroxysmal atrial fibrillation, hysterectomy, COPD, history of

CHF with diastolic dysfunction per the patient. 



CURRENT MEDICATIONS:  The patient is on;

1. Symbicort 80/4.5 mcg two puffs twice daily.

2. Metoprolol tartrate 100 mg twice daily.

3. Albuterol inhaler q.6 hourly p.r.n.

4. Amiodarone 200 mg p.o. daily.

5. Sertraline 100 mg p.o. daily.

6. Procardia XL 30 mg daily.

7. Folic acid 1 mg daily.

8. Cilostazol 100 mg twice daily.

9. Eliquis 5 mg twice daily.

10. Atorvastatin 40 mg p.o. at bedtime.

11. Glipizide 10 mg daily.

12. Potassium chloride 20 mEq p.o. daily.

13. Metformin 1000 mg twice daily.

14. Lasix 40 mg p.o. twice daily.



ALLERGIES:  ALLERGIC TO CODEINE.



PERSONAL HISTORY:  Smokes eight cigarettes a day and has been doing so for the last

50 years or so.  Does not abuse alcohol or drugs.  Lives with her daughter in

[place]. 



FAMILY HISTORY:  Mother  at the age of 68, she had history of colon cancer.

Father  of diabetes and its complications in his 70s. 



CODE STATUS:  Full.  Power of  is her daughter, Ms. Uziel Dumont.



REVIEW OF SYSTEMS:  CONSTITUTIONAL:  Negative for weight loss or gain, ability to

conduct usual activities. 

SKIN:  Negative for rash, itching. 

EYES:  Negative for double vision, pain. 

ENT/MOUTH:  Negative for nose bleeding, neck stiffness, pain, tenderness. 

CARDIOVASCULAR:  Negative for palpitations, dyspnea on exertion, orthopnea. 

RESPIRATORY:  Negative for shortness of breath, wheezing, cough, hemoptysis, fever

or night sweats. 

GASTROINTESTINAL:  Negative for poor appetite, abdominal pain, heartburn, nausea,

vomiting, constipation, or diarrhea. 

GENITOURINARY:  Negative for urgency, frequency, dysuria, nocturia. 

MUSCULOSKELETAL:  Negative for pain, swelling. 

NEUROLOGIC/PSYCHIATRIC:  Negative for anxiety, depression. 

ALLERGY/IMMUNOLOGIC:  Negative for skin rash, bleeding tendency.



PHYSICAL EXAMINATION:

GENERAL:  The patient is a 69-year-old female, who is currently not in any acute

distress. 

VITAL SIGNS:  Blood pressure 180/86, pulse 90 per minute, respiratory rate is 24 per

minute, temperature 99.9 degrees Fahrenheit, saturating 87% on room air and 94% on 2

L nasal cannula. 

NECK:  Supple.  No elevated JVD. 

HEENT:  Eyes; extraocular muscles intact.  Pupils reacting to light.  Oral cavity,

mucous membranes are moist.  No exudates or congestion. 

CARDIOVASCULAR SYSTEM:  S1 and S2 heard.  Regular rhythm. 

RESPIRATORY SYSTEM:  Air entry 1+ bilateral.  Scattered rhonchi plus mild wheezes

plus bilateral. 

ABDOMEN:  Soft.  Bowel sounds heard.  No tenderness, rigidity, or guarding. 

EXTREMITIES:  Mild pedal edema.  No calf tenderness. 

VASCULAR SYSTEM:  Peripheral pulses 1+ bilateral.  No ischemic ulcerations or

gangrene. 

CENTRAL NERVOUS SYSTEM:  No gross focal deficits noted.  The patient is alert,

awake, and oriented well. 

PSYCHIATRIC:  The patient's mood is euthymic.  No hallucinations or delusions.



LABORATORY DATA:  Chest x-ray done shows changes of pulmonary fibrosis with

hyperinflation.  There are changes of COPD.  BUN 18, creatinine 1.24, serum glucose

208, serum bicarb 22.  BNP is 654.  Troponin I x1 is negative.  Albumin 4.2.

Hemoglobin and hematocrit of 11 and 35, platelet count 268, white count of 11.4, MCV

is 81 with 76% neutrophils.  EKG done shows normal sinus rhythm at 92 beats per

minute.  There is incomplete RBBB seen.  The Q-wave seen in leads II, III, aVF, and

V2, V3.  QRS duration is 100 millisecond, corrected QT is 497 milliseconds. 



CLINICAL IMPRESSION AND PLAN:  The patient will be under observation on medical

floor for chronic obstructive pulmonary disease exacerbation and mild congestive

heart failure exacerbation likely diastolic dysfunction.  She will be on DuoNeb q.6

hourly, Solu-Medrol 40 mg IV q.6 hourly, Ceftin 250 mg twice daily for the COPD

exacerbation.  We will switch her oral Lasix to IV Lasix 40 mg IV at 6 a.m. and 2

p.m.  We will continue amiodarone, Lipitor, Symbicort inhalers, cilostazol, folic

acid, glipizide, metformin, Procardia XL, metoprolol tartrate, and sertraline as

before.  Echo with 2D Doppler for LV function will be obtained.  We will closely

monitor for any hemodynamic compromise. 







Job ID:  [id number]

## 2020-03-18 VITALS — TEMPERATURE: 98.2 F | DIASTOLIC BLOOD PRESSURE: 67 MMHG | SYSTOLIC BLOOD PRESSURE: 121 MMHG

## 2020-03-18 LAB
ANION GAP SERPL CALC-SCNC: 18 MMOL/L (ref 10–20)
BASOPHILS # BLD AUTO: 0 THOU/UL (ref 0–0.2)
BASOPHILS NFR BLD AUTO: 0.1 % (ref 0–1)
BUN SERPL-MCNC: 32 MG/DL (ref 9.8–20.1)
CALCIUM SERPL-MCNC: 8.9 MG/DL (ref 7.8–10.44)
CHLORIDE SERPL-SCNC: 98 MMOL/L (ref 98–107)
CO2 SERPL-SCNC: 20 MMOL/L (ref 23–31)
CREAT CL PREDICTED SERPL C-G-VRATE: 50 ML/MIN (ref 70–130)
EOSINOPHIL # BLD AUTO: 0 THOU/UL (ref 0–0.7)
EOSINOPHIL NFR BLD AUTO: 0.1 % (ref 0–10)
GLUCOSE SERPL-MCNC: 576 MG/DL (ref 83–110)
HGB BLD-MCNC: 9.9 G/DL (ref 12–16)
LYMPHOCYTES # BLD: 0.5 THOU/UL (ref 1.2–3.4)
LYMPHOCYTES NFR BLD AUTO: 6.1 % (ref 21–51)
MCH RBC QN AUTO: 26.1 PG (ref 27–31)
MCV RBC AUTO: 80.5 FL (ref 78–98)
MONOCYTES # BLD AUTO: 0.4 THOU/UL (ref 0.11–0.59)
MONOCYTES NFR BLD AUTO: 4.4 % (ref 0–10)
NEUTROPHILS # BLD AUTO: 7.7 THOU/UL (ref 1.4–6.5)
NEUTROPHILS NFR BLD AUTO: 89.3 % (ref 42–75)
PLATELET # BLD AUTO: 203 THOU/UL (ref 130–400)
POTASSIUM SERPL-SCNC: 3.9 MMOL/L (ref 3.5–5.1)
RBC # BLD AUTO: 3.78 MILL/UL (ref 4.2–5.4)
SODIUM SERPL-SCNC: 132 MMOL/L (ref 136–145)
WBC # BLD AUTO: 8.6 THOU/UL (ref 4.8–10.8)

## 2020-03-18 RX ADMIN — MOMETASONE FUROATE AND FORMOTEROL FUMARATE DIHYDRATE SCH PUFF: 100; 5 AEROSOL RESPIRATORY (INHALATION) at 07:44

## 2020-03-18 NOTE — DIS
DATE OF ADMISSION:  03/17/2020



DATE OF DISCHARGE:  03/18/2020



DISCHARGE DISPOSITION:  To home.



PRIMARY DISCHARGE DIAGNOSES:  Acute chronic obstructive pulmonary disease

exacerbation; mild congestive heart failure exacerbation with diastolic dysfunction,

resolved. 



SECONDARY DISCHARGE DIAGNOSES:  History of coronary artery disease with prior single

stent, history of right lower extremity peripheral vascular disease with prior

femoral-popliteal and stents, obesity, history of deep vein thrombosis and pulmonary

embolus, diabetes mellitus type 2, history of paroxysmal atrial fibrillation. 



PROCEDURES DONE DURING HOSPITALIZATION:  The patient has had chest x-ray done, which

showed no acute infiltrate.  There are findings of possible pulmonary fibrosis with

hyperinflation suggestive of COPD.  Echo with 2D Doppler showed EF of 60% to 65%

with diastolic dysfunction, moderately enlarged right atrium, moderate pulmonic

regurgitation.  H and H 10 and 30, platelet count 203, MCV is 80, with 89%

neutrophils.  BUN 32, creatinine 1.5.  .  TSH 0.36. 



DISCHARGE MEDICATIONS:  

1. Amiodarone 200 mg p.o. daily.

2. Atorvastatin 40 mg p.o. at bedtime.

3. Symbicort 80/4.5 mcg two puffs twice daily.

4. Cilostazol 100 mg twice daily.

5. Folic acid 1 mg p.o. daily.

6. Lasix 40 mg twice daily.

7. Glipizide 10 mg daily.

8. Metformin 1000 mg twice daily.

9. Lopressor 100 mg twice daily.

10. K-Dur 20 mEq p.o. daily.

11. Sertraline 100 mg p.o. daily.

12. DuoNeb q.6 hourly p.r.n.

13. Procardia XL 30 mg p.o. daily.



ALLERGIES:  ALLERGIC TO CODEINE.



DISCHARGE PLAN:  The patient to follow up with her primary care physician in [place]

in 1 week. 



BRIEF COURSE DURING HOSPITALIZATION:  The patient initially got admitted with

complaints of shortness of breath and wheezing.  She was also found to be in mild

CHF exacerbation with diastolic dysfunction.  The patient has had gentle diuresis

done.  She was also placed on brief steroids along with nebulizations.  The patient

has responded well to above measures.  Her diabetes as expected is a bit

uncontrolled due to short course of steroids which has been discontinued at the time

of discharge.  She has diuresed well briefly during her stay here.  She needs to

continue all her home medications as prescribed.  She was also given a prescription

for nebulizer in view of ongoing tobacco abuse and history of COPD with current

exacerbation.  Please note, I have seen and examined the patient on the day of

discharge.  She was counseled with regard to tobacco abuse cessation. 







Job ID:  [id number]

## 2020-03-21 NOTE — EKG
Test Reason : 

Blood Pressure : ***/*** mmHG

Vent. Rate : 092 BPM     Atrial Rate : 092 BPM

   P-R Int : 196 ms          QRS Dur : 100 ms

    QT Int : 402 ms       P-R-T Axes : 069 253 044 degrees

   QTc Int : 497 ms

 

Normal sinus rhythm

Right superior axis deviation

Incomplete right bundle branch block

Inferior infarct , age undetermined

Possible Anterior infarct , age undetermined

Abnormal ECG

 

Confirmed by RANDI HANSON ([id number]),  VIRIDIANA RODRIGUEZ ([id number]) on 3/21/2020 1:14:35 PM

 

Referred By:             Confirmed By:RANDI HANSON

## 2021-04-10 ENCOUNTER — HOSPITAL ENCOUNTER (INPATIENT)
Dept: HOSPITAL 92 - CSHERS | Age: 71
LOS: 2 days | Discharge: HOME | DRG: 65 | End: 2021-04-12
Attending: STUDENT IN AN ORGANIZED HEALTH CARE EDUCATION/TRAINING PROGRAM | Admitting: INTERNAL MEDICINE
Payer: MEDICARE

## 2021-04-10 VITALS — BODY MASS INDEX: 28.3 KG/M2

## 2021-04-10 DIAGNOSIS — D63.1: ICD-10-CM

## 2021-04-10 DIAGNOSIS — I50.32: ICD-10-CM

## 2021-04-10 DIAGNOSIS — N18.9: ICD-10-CM

## 2021-04-10 DIAGNOSIS — Z95.5: ICD-10-CM

## 2021-04-10 DIAGNOSIS — Z79.899: ICD-10-CM

## 2021-04-10 DIAGNOSIS — I13.0: ICD-10-CM

## 2021-04-10 DIAGNOSIS — Z79.82: ICD-10-CM

## 2021-04-10 DIAGNOSIS — Z79.84: ICD-10-CM

## 2021-04-10 DIAGNOSIS — Z79.51: ICD-10-CM

## 2021-04-10 DIAGNOSIS — Z91.14: ICD-10-CM

## 2021-04-10 DIAGNOSIS — I63.512: Primary | ICD-10-CM

## 2021-04-10 DIAGNOSIS — Z88.5: ICD-10-CM

## 2021-04-10 DIAGNOSIS — J44.9: ICD-10-CM

## 2021-04-10 DIAGNOSIS — E78.5: ICD-10-CM

## 2021-04-10 DIAGNOSIS — G93.49: ICD-10-CM

## 2021-04-10 DIAGNOSIS — J44.1: ICD-10-CM

## 2021-04-10 DIAGNOSIS — Z71.6: ICD-10-CM

## 2021-04-10 DIAGNOSIS — Z90.710: ICD-10-CM

## 2021-04-10 DIAGNOSIS — I25.2: ICD-10-CM

## 2021-04-10 DIAGNOSIS — Z20.822: ICD-10-CM

## 2021-04-10 DIAGNOSIS — F17.210: ICD-10-CM

## 2021-04-10 DIAGNOSIS — E11.22: ICD-10-CM

## 2021-04-10 DIAGNOSIS — E11.51: ICD-10-CM

## 2021-04-10 DIAGNOSIS — I25.10: ICD-10-CM
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
## 2021-04-10 LAB
ALBUMIN SERPL BCG-MCNC: 3.9 G/DL (ref 3.4–4.8)
ALP SERPL-CCNC: 98 U/L (ref 40–110)
ALT SERPL W P-5'-P-CCNC: 10 U/L (ref 8–55)
ANION GAP SERPL CALC-SCNC: 16 MMOL/L (ref 10–20)
ANISOCYTOSIS BLD QL SMEAR: (no result) (100X)
AST SERPL-CCNC: 9 U/L (ref 5–34)
BASOPHILS # BLD AUTO: 0.1 10X3/UL (ref 0–0.2)
BASOPHILS NFR BLD AUTO: 1.1 % (ref 0–2)
BILIRUB SERPL-MCNC: 0.4 MG/DL (ref 0.2–1.2)
BUN SERPL-MCNC: 22 MG/DL (ref 9.8–20.1)
CALCIUM SERPL-MCNC: 8.6 MG/DL (ref 7.8–10.44)
CHLORIDE SERPL-SCNC: 108 MMOL/L (ref 98–107)
CO2 SERPL-SCNC: 22 MMOL/L (ref 23–31)
CREAT CL PREDICTED SERPL C-G-VRATE: 0 ML/MIN (ref 70–130)
EOSINOPHIL # BLD AUTO: 0.1 10X3/UL (ref 0–0.5)
EOSINOPHIL NFR BLD AUTO: 1.3 % (ref 0–6)
GLOBULIN SER CALC-MCNC: 2.8 G/DL (ref 2.4–3.5)
GLUCOSE SERPL-MCNC: 209 MG/DL (ref 83–110)
HGB BLD-MCNC: 8.9 G/DL (ref 12–15.5)
LYMPHOCYTES NFR BLD AUTO: 20.3 % (ref 18–47)
MCH RBC QN AUTO: 21.9 PG (ref 27–33)
MCV RBC AUTO: 75.4 FL (ref 81.6–98.3)
MICROCYTES BLD QL SMEAR: (no result) (100X)
MONOCYTES # BLD AUTO: 0.5 10X3/UL (ref 0–1.1)
MONOCYTES NFR BLD AUTO: 11.8 % (ref 0–10)
NEUTROPHILS # BLD AUTO: 2.9 10X3/UL (ref 1.5–8.4)
NEUTROPHILS NFR BLD AUTO: 65.3 % (ref 40–75)
OVALOCYTES BLD QL SMEAR: (no result) (100X)
PLATELET # BLD AUTO: 265 10X3/UL (ref 150–450)
POTASSIUM SERPL-SCNC: 3.5 MMOL/L (ref 3.5–5.1)
RBC # BLD AUTO: 4.07 10X6/UL (ref 3.9–5.03)
SODIUM SERPL-SCNC: 142 MMOL/L (ref 136–145)
SP GR UR STRIP: 1.01 (ref 1–1.04)
STOMATOCYTES BLD QL SMEAR: (no result) (100X)
WBC # BLD AUTO: 4.5 10X3/UL (ref 3.5–10.5)

## 2021-04-10 PROCEDURE — 36416 COLLJ CAPILLARY BLOOD SPEC: CPT

## 2021-04-10 PROCEDURE — 85025 COMPLETE CBC W/AUTO DIFF WBC: CPT

## 2021-04-10 PROCEDURE — U0003 INFECTIOUS AGENT DETECTION BY NUCLEIC ACID (DNA OR RNA); SEVERE ACUTE RESPIRATORY SYNDROME CORONAVIRUS 2 (SARS-COV-2) (CORONAVIRUS DISEASE [COVID-19]), AMPLIFIED PROBE TECHNIQUE, MAKING USE OF HIGH THROUGHPUT TECHNOLOGIES AS DESCRIBED BY CMS-2020-01-R: HCPCS

## 2021-04-10 PROCEDURE — 36415 COLL VENOUS BLD VENIPUNCTURE: CPT

## 2021-04-10 PROCEDURE — 93880 EXTRACRANIAL BILAT STUDY: CPT

## 2021-04-10 PROCEDURE — U0005 INFEC AGEN DETEC AMPLI PROBE: HCPCS

## 2021-04-10 PROCEDURE — 93005 ELECTROCARDIOGRAM TRACING: CPT

## 2021-04-10 PROCEDURE — 71045 X-RAY EXAM CHEST 1 VIEW: CPT

## 2021-04-10 PROCEDURE — 83036 HEMOGLOBIN GLYCOSYLATED A1C: CPT

## 2021-04-10 PROCEDURE — 84484 ASSAY OF TROPONIN QUANT: CPT

## 2021-04-10 PROCEDURE — 70450 CT HEAD/BRAIN W/O DYE: CPT

## 2021-04-10 PROCEDURE — 81003 URINALYSIS AUTO W/O SCOPE: CPT

## 2021-04-10 PROCEDURE — 87635 SARS-COV-2 COVID-19 AMP PRB: CPT

## 2021-04-10 PROCEDURE — 94640 AIRWAY INHALATION TREATMENT: CPT

## 2021-04-10 PROCEDURE — 94664 DEMO&/EVAL PT USE INHALER: CPT

## 2021-04-10 PROCEDURE — 80053 COMPREHEN METABOLIC PANEL: CPT

## 2021-04-10 PROCEDURE — 70551 MRI BRAIN STEM W/O DYE: CPT

## 2021-04-10 PROCEDURE — 80061 LIPID PANEL: CPT

## 2021-04-10 PROCEDURE — 93306 TTE W/DOPPLER COMPLETE: CPT

## 2021-04-10 RX ADMIN — MOMETASONE FUROATE AND FORMOTEROL FUMARATE DIHYDRATE SCH: 100; 5 AEROSOL RESPIRATORY (INHALATION) at 21:13

## 2021-04-11 LAB
ALBUMIN SERPL BCG-MCNC: 3.7 G/DL (ref 3.4–4.8)
ALP SERPL-CCNC: 95 U/L (ref 40–110)
ALT SERPL W P-5'-P-CCNC: 10 U/L (ref 8–55)
ANION GAP SERPL CALC-SCNC: 14 MMOL/L (ref 10–20)
AST SERPL-CCNC: 15 U/L (ref 5–34)
BASOPHILS # BLD AUTO: 0.1 10X3/UL (ref 0–0.2)
BASOPHILS NFR BLD AUTO: 1.1 % (ref 0–2)
BILIRUB SERPL-MCNC: 0.4 MG/DL (ref 0.2–1.2)
BUN SERPL-MCNC: 23 MG/DL (ref 9.8–20.1)
CALCIUM SERPL-MCNC: 9 MG/DL (ref 7.8–10.44)
CHD RISK SERPL-RTO: 6.6 (ref ?–4.5)
CHLORIDE SERPL-SCNC: 107 MMOL/L (ref 98–107)
CHOLEST SERPL-MCNC: 172 MG/DL
CO2 SERPL-SCNC: 22 MMOL/L (ref 23–31)
CREAT CL PREDICTED SERPL C-G-VRATE: 61 ML/MIN (ref 70–130)
EOSINOPHIL # BLD AUTO: 0.1 10X3/UL (ref 0–0.5)
EOSINOPHIL NFR BLD AUTO: 1.8 % (ref 0–6)
GLOBULIN SER CALC-MCNC: 3.2 G/DL (ref 2.4–3.5)
GLUCOSE SERPL-MCNC: 76 MG/DL (ref 83–110)
HDLC SERPL-MCNC: 26 MG/DL
HGB BLD-MCNC: 9.1 G/DL (ref 12–15.5)
LDLC SERPL CALC-MCNC: 75 MG/DL
LYMPHOCYTES NFR BLD AUTO: 17 % (ref 18–47)
MCH RBC QN AUTO: 21.5 PG (ref 27–33)
MCV RBC AUTO: 76.7 FL (ref 81.6–98.3)
MONOCYTES # BLD AUTO: 0.6 10X3/UL (ref 0–1.1)
MONOCYTES NFR BLD AUTO: 10.3 % (ref 0–10)
NEUTROPHILS # BLD AUTO: 4.3 10X3/UL (ref 1.5–8.4)
NEUTROPHILS NFR BLD AUTO: 69.6 % (ref 40–75)
PLATELET # BLD AUTO: 286 10X3/UL (ref 150–450)
POTASSIUM SERPL-SCNC: 3.2 MMOL/L (ref 3.5–5.1)
RBC # BLD AUTO: 4.24 10X6/UL (ref 3.9–5.03)
SODIUM SERPL-SCNC: 140 MMOL/L (ref 136–145)
TRIGL SERPL-MCNC: 354 MG/DL (ref ?–150)
WBC # BLD AUTO: 6.2 10X3/UL (ref 3.5–10.5)

## 2021-04-11 RX ADMIN — MOMETASONE FUROATE AND FORMOTEROL FUMARATE DIHYDRATE SCH: 100; 5 AEROSOL RESPIRATORY (INHALATION) at 13:15

## 2021-04-11 RX ADMIN — ASPIRIN SCH MG: 81 TABLET ORAL at 08:19

## 2021-04-11 RX ADMIN — MOMETASONE FUROATE AND FORMOTEROL FUMARATE DIHYDRATE SCH MCG: 100; 5 AEROSOL RESPIRATORY (INHALATION) at 21:16

## 2021-04-12 VITALS — DIASTOLIC BLOOD PRESSURE: 61 MMHG | SYSTOLIC BLOOD PRESSURE: 129 MMHG

## 2021-04-12 VITALS — TEMPERATURE: 98.5 F

## 2021-04-12 RX ADMIN — MOMETASONE FUROATE AND FORMOTEROL FUMARATE DIHYDRATE SCH: 100; 5 AEROSOL RESPIRATORY (INHALATION) at 10:21

## 2021-04-12 RX ADMIN — ALBUTEROL SULFATE PRN INHALER: 90 AEROSOL, METERED RESPIRATORY (INHALATION) at 00:50

## 2021-04-12 RX ADMIN — ALBUTEROL SULFATE PRN INHALER: 90 AEROSOL, METERED RESPIRATORY (INHALATION) at 11:48

## 2021-04-12 RX ADMIN — ASPIRIN SCH MG: 81 TABLET ORAL at 08:21

## 2021-08-12 ENCOUNTER — HOSPITAL ENCOUNTER (EMERGENCY)
Dept: HOSPITAL 92 - CSHERS | Age: 71
Discharge: HOME | End: 2021-08-12
Payer: MEDICARE

## 2021-08-12 DIAGNOSIS — E11.9: ICD-10-CM

## 2021-08-12 DIAGNOSIS — I25.2: ICD-10-CM

## 2021-08-12 DIAGNOSIS — M79.604: ICD-10-CM

## 2021-08-12 DIAGNOSIS — I11.0: Primary | ICD-10-CM

## 2021-08-12 DIAGNOSIS — F17.210: ICD-10-CM

## 2021-08-12 DIAGNOSIS — I50.9: ICD-10-CM

## 2021-08-12 DIAGNOSIS — J44.9: ICD-10-CM

## 2021-08-12 DIAGNOSIS — E78.5: ICD-10-CM

## 2021-08-12 LAB
ALBUMIN SERPL BCG-MCNC: 3.6 G/DL (ref 3.4–4.8)
ALP SERPL-CCNC: 132 U/L (ref 40–110)
ALT SERPL W P-5'-P-CCNC: 69 U/L (ref 8–55)
ANION GAP SERPL CALC-SCNC: 15 MMOL/L (ref 10–20)
AST SERPL-CCNC: 57 U/L (ref 5–34)
BASOPHILS # BLD AUTO: 0.1 10X3/UL (ref 0–0.2)
BASOPHILS NFR BLD AUTO: 0.9 % (ref 0–2)
BILIRUB SERPL-MCNC: 0.4 MG/DL (ref 0.2–1.2)
BUN SERPL-MCNC: 23 MG/DL (ref 9.8–20.1)
CALCIUM SERPL-MCNC: 8.9 MG/DL (ref 7.8–10.44)
CHLORIDE SERPL-SCNC: 110 MMOL/L (ref 98–107)
CO2 SERPL-SCNC: 22 MMOL/L (ref 23–31)
CREAT CL PREDICTED SERPL C-G-VRATE: 0 ML/MIN (ref 70–130)
EOSINOPHIL # BLD AUTO: 0.1 10X3/UL (ref 0–0.5)
EOSINOPHIL NFR BLD AUTO: 1.4 % (ref 0–6)
GLOBULIN SER CALC-MCNC: 3.4 G/DL (ref 2.4–3.5)
GLUCOSE SERPL-MCNC: 134 MG/DL (ref 83–110)
HGB BLD-MCNC: 8.7 G/DL (ref 12–15.5)
LYMPHOCYTES NFR BLD AUTO: 19.8 % (ref 18–47)
MCH RBC QN AUTO: 25.4 PG (ref 27–33)
MCV RBC AUTO: 88.3 FL (ref 81.6–98.3)
MONOCYTES # BLD AUTO: 0.6 10X3/UL (ref 0–1.1)
MONOCYTES NFR BLD AUTO: 11.4 % (ref 0–10)
NEUTROPHILS # BLD AUTO: 3.7 10X3/UL (ref 1.5–8.4)
NEUTROPHILS NFR BLD AUTO: 66 % (ref 40–75)
PLATELET # BLD AUTO: 230 10X3/UL (ref 150–450)
POTASSIUM SERPL-SCNC: 3.8 MMOL/L (ref 3.5–5.1)
RBC # BLD AUTO: 3.42 10X6/UL (ref 3.9–5.03)
SODIUM SERPL-SCNC: 143 MMOL/L (ref 136–145)
WBC # BLD AUTO: 5.6 10X3/UL (ref 3.5–10.5)

## 2021-08-12 PROCEDURE — 85379 FIBRIN DEGRADATION QUANT: CPT

## 2021-08-12 PROCEDURE — 80053 COMPREHEN METABOLIC PANEL: CPT

## 2021-08-12 PROCEDURE — 85025 COMPLETE CBC W/AUTO DIFF WBC: CPT

## 2021-08-12 PROCEDURE — 83880 ASSAY OF NATRIURETIC PEPTIDE: CPT

## 2021-08-12 PROCEDURE — 96375 TX/PRO/DX INJ NEW DRUG ADDON: CPT

## 2021-08-12 PROCEDURE — 96374 THER/PROPH/DIAG INJ IV PUSH: CPT
